# Patient Record
Sex: MALE | Race: WHITE | NOT HISPANIC OR LATINO | Employment: FULL TIME | URBAN - METROPOLITAN AREA
[De-identification: names, ages, dates, MRNs, and addresses within clinical notes are randomized per-mention and may not be internally consistent; named-entity substitution may affect disease eponyms.]

---

## 2017-12-15 ENCOUNTER — NON-PROVIDER VISIT (OUTPATIENT)
Dept: URGENT CARE | Facility: CLINIC | Age: 27
End: 2017-12-15

## 2017-12-15 DIAGNOSIS — Z02.1 PRE-EMPLOYMENT DRUG SCREENING: ICD-10-CM

## 2017-12-15 LAB
AMP AMPHETAMINE: NORMAL
COC COCAINE: NORMAL
INT CON NEG: NORMAL
INT CON POS: NORMAL
MET METHAMPHETAMINES: NORMAL
OPI OPIATES: NORMAL
PCP PHENCYCLIDINE: NORMAL
POC DRUG COMMENT 753798-OCCUPATIONAL HEALTH: NEGATIVE
THC: NORMAL

## 2017-12-15 PROCEDURE — 80305 DRUG TEST PRSMV DIR OPT OBS: CPT | Performed by: NURSE PRACTITIONER

## 2021-09-15 ENCOUNTER — OFFICE VISIT (OUTPATIENT)
Dept: URGENT CARE | Facility: CLINIC | Age: 31
End: 2021-09-15

## 2021-09-15 VITALS
RESPIRATION RATE: 16 BRPM | OXYGEN SATURATION: 95 % | BODY MASS INDEX: 26.67 KG/M2 | HEIGHT: 68 IN | TEMPERATURE: 98.8 F | DIASTOLIC BLOOD PRESSURE: 74 MMHG | WEIGHT: 176 LBS | SYSTOLIC BLOOD PRESSURE: 112 MMHG | HEART RATE: 122 BPM

## 2021-09-15 DIAGNOSIS — L50.9 URTICARIA: ICD-10-CM

## 2021-09-15 PROCEDURE — 99203 OFFICE O/P NEW LOW 30 MIN: CPT | Mod: 25 | Performed by: PHYSICIAN ASSISTANT

## 2021-09-15 RX ORDER — PREDNISONE 10 MG/1
TABLET ORAL
Qty: 30 TABLET | Refills: 0 | Status: CANCELLED | OUTPATIENT
Start: 2021-09-15

## 2021-09-15 RX ORDER — DEXAMETHASONE SODIUM PHOSPHATE 10 MG/ML
10 INJECTION INTRAMUSCULAR; INTRAVENOUS ONCE
Status: COMPLETED | OUTPATIENT
Start: 2021-09-15 | End: 2021-09-15

## 2021-09-15 RX ORDER — PREDNISONE 10 MG/1
TABLET ORAL
Qty: 21 TABLET | Refills: 0 | Status: SHIPPED | OUTPATIENT
Start: 2021-09-15

## 2021-09-15 RX ADMIN — DEXAMETHASONE SODIUM PHOSPHATE 10 MG: 10 INJECTION INTRAMUSCULAR; INTRAVENOUS at 14:35

## 2021-09-15 ASSESSMENT — ENCOUNTER SYMPTOMS
VOMITING: 0
SHORTNESS OF BREATH: 0
MYALGIAS: 0
EYE REDNESS: 0
EYE DISCHARGE: 0
SORE THROAT: 0
FEVER: 0
HEADACHES: 0
COUGH: 0
DIARRHEA: 0
NAUSEA: 0

## 2021-09-15 NOTE — PROGRESS NOTES
Subjective     Austin Lai is a 30 y.o. male who presents with Rash (started yesterday, started on hands and feet the spread, no known cause)          This is a new problem.  The patient presents to clinic complaining of a diffuse rash x1 day.  The patient states he first noticed the rash to his bilateral hands and feet.  The patient states the rash has continued to spread.  The patient describes the rash as red.  The patient notes slight swelling to his bilateral hands.  The patient states the rash is not itchy.  The patient reports no new exposures to soaps, lotions, or detergents.  He also reports no associated facial swelling.  No difficulty breathing.  No difficulty swallowing.  No fever.  The patient has not taken any OTC medications for his current symptoms.    Rash  This is a new problem. Episode onset: x 1 day ago. The problem is unchanged. The rash is diffuse. The rash is characterized by redness. He was exposed to nothing. Pertinent negatives include no congestion, cough, diarrhea, facial edema, fever, shortness of breath, sore throat or vomiting. Past treatments include nothing.     PMH:  has no past medical history on file.  MEDS: No current outpatient medications on file.  ALLERGIES: No Known Allergies  SURGHX: History reviewed. No pertinent surgical history.  SOCHX:  reports that he has never smoked. He has never used smokeless tobacco.  FH: Family history was reviewed, no pertinent findings to report      Review of Systems   Constitutional: Negative for fever.   HENT: Negative for congestion, ear pain and sore throat.    Eyes: Negative for discharge and redness.   Respiratory: Negative for cough and shortness of breath.    Gastrointestinal: Negative for diarrhea, nausea and vomiting.   Musculoskeletal: Negative for myalgias.   Skin: Positive for rash.   Neurological: Negative for headaches.              Objective     /74   Pulse (!) 122   Temp 37.1 °C (98.8 °F) (Temporal)   Resp 16   " Ht 1.727 m (5' 8\")   Wt 79.8 kg (176 lb)   SpO2 95%   BMI 26.76 kg/m²      Physical Exam  Constitutional:       General: He is not in acute distress.     Appearance: Normal appearance. He is well-developed. He is not ill-appearing.   HENT:      Head: Normocephalic and atraumatic.      Comments: No swelling of the face or lips.     Right Ear: External ear normal.      Left Ear: External ear normal.      Nose: Nose normal.      Mouth/Throat:      Mouth: Mucous membranes are moist. No angioedema.      Pharynx: Oropharynx is clear. Uvula midline. No posterior oropharyngeal erythema.      Tonsils: No tonsillar exudate.      Comments: No swelling of the tongue or throat.  Eyes:      Extraocular Movements: Extraocular movements intact.      Conjunctiva/sclera: Conjunctivae normal.   Cardiovascular:      Rate and Rhythm: Regular rhythm. Tachycardia present.      Heart sounds: Normal heart sounds.   Pulmonary:      Effort: Pulmonary effort is normal.      Breath sounds: Normal breath sounds. No wheezing.   Musculoskeletal:      Cervical back: Normal range of motion and neck supple.   Skin:     General: Skin is warm and dry.      Findings: Rash present. Rash is urticarial (diffuse).   Neurological:      Mental Status: He is alert and oriented to person, place, and time.               Progress:  Decadron 10mg PO given in clinic   The patient tolerated the medication without immediate side effects. The patient was observed in clinic for approximately 10 minutes following the administration of this medication.           Assessment & Plan          1. Urticaria  - dexamethasone (DECADRON) injection (check route below) 10 mg  - predniSONE (DELTASONE) 10 MG Tab; Take 40mg (4 tabs) by mouth once daily x 3 days. Take 20mg (2 tabs) by mouth once daily x 3 days. Take 10mg (1 tab) by mouth once daily x 3 days.  Dispense: 21 Tablet; Refill: 0    The patient's presenting symptoms and physical exam findings are consistent with " urticaria. On physical exam, the patient had diffuse urticaria. No signs of angioedema were appreciated. The remainder the patient's physical exam today in clinic was normal with the exception of an elevated heart rate. The patient appears in no acute distress. The patient's vital signs are stable and within normal limits, with exception of his elevated heart rate as previously mentioned. He is afebrile today in clinic. The patient was given Decadron 10 mg p.o. today in clinic for his acute hives. We will also prescribe the patient a prednisone taper for his current symptoms. Advised patient to monitor worsening signs and/or symptoms. Recommend OTC medications and supportive care for symptomatic management. Recommend patient follow-up with PCP as needed peer discussed precautions with the patient, and he verbalized understanding.    Differential diagnoses, supportive care, and indications for immediate follow-up discussed with patient.   Instructed to return to clinic or nearest emergency department for any change in condition, further concerns, or worsening of symptoms.    OTC antihistamines for symptomatic relief  OTC antiitch cream for symptomatic relief  Monitor for worsening signs and/or symptoms  Follow-up with PCP  Return to clinic or go to the ED if symptoms worsen or fail to improve, or if patient develop worsening/increasing/persistent skin rash, pain/tenderness affected area, swelling, increased redness or warmth, discharge/drainage, swelling of the face, lips, tongue, or throat, difficulty swallowing, difficulty breathing, wheezing, fever/chills, and/or any concerning symptoms.    Discussed plan with the patient, and he agrees to the above.    I personally reviewed prior external notes and test results pertinent to today's visit.  I have independently reviewed and interpreted all diagnostics ordered during this urgent care visit.     Time spent evaluating this patient was at least 30 minutes and includes  preparing for visit, obtaining history, exam and evaluation, ordering labs/tests/procedures/medications, independent interpretation, and counseling/education.    Please note that this dictation was created using voice recognition software. I have made every reasonable attempt to correct obvious errors, but I expect that there may be errors of grammar and possibly content that I did not discover before finalizing the note.     This note was electronically signed by Bella Howell PA-C